# Patient Record
Sex: MALE | Race: WHITE | NOT HISPANIC OR LATINO | Employment: OTHER | URBAN - NONMETROPOLITAN AREA
[De-identification: names, ages, dates, MRNs, and addresses within clinical notes are randomized per-mention and may not be internally consistent; named-entity substitution may affect disease eponyms.]

---

## 2023-09-12 ENCOUNTER — OFFICE VISIT (OUTPATIENT)
Dept: CARDIOLOGY | Facility: CLINIC | Age: 78
End: 2023-09-12
Payer: MEDICARE

## 2023-09-12 VITALS
OXYGEN SATURATION: 96 % | WEIGHT: 184.4 LBS | HEART RATE: 74 BPM | BODY MASS INDEX: 27.95 KG/M2 | HEIGHT: 68 IN | DIASTOLIC BLOOD PRESSURE: 68 MMHG | SYSTOLIC BLOOD PRESSURE: 134 MMHG

## 2023-09-12 DIAGNOSIS — I65.23 BILATERAL CAROTID ARTERY STENOSIS: Primary | ICD-10-CM

## 2023-09-12 DIAGNOSIS — I10 ESSENTIAL HYPERTENSION: ICD-10-CM

## 2023-09-12 DIAGNOSIS — E78.5 DYSLIPIDEMIA: ICD-10-CM

## 2023-09-12 RX ORDER — ROSUVASTATIN CALCIUM 20 MG/1
20 TABLET, COATED ORAL DAILY
COMMUNITY
Start: 2023-05-30

## 2023-09-12 RX ORDER — NIFEDIPINE 90 MG/1
90 TABLET, EXTENDED RELEASE ORAL DAILY
Qty: 30 TABLET | Refills: 11 | COMMUNITY
Start: 2023-09-06 | End: 2024-09-05

## 2023-09-12 RX ORDER — CETIRIZINE HYDROCHLORIDE 10 MG/1
10 TABLET ORAL DAILY
Qty: 30 TABLET | Refills: 11 | COMMUNITY
Start: 2023-08-09 | End: 2024-08-08

## 2023-09-12 RX ORDER — ASPIRIN 81 MG/1
81 TABLET ORAL DAILY
COMMUNITY

## 2023-09-12 RX ORDER — LEVOCETIRIZINE DIHYDROCHLORIDE 5 MG/1
1 TABLET, FILM COATED ORAL DAILY
COMMUNITY
Start: 2023-08-07

## 2023-09-12 NOTE — PROGRESS NOTES
Subjective     Vimal Austin is a 78 y.o. male who presents today for Establish Care (Cardiac Eval. For Carotid Artery stenosis/Records in Care everywhere ).    CHIEF COMPLIANT  Chief Complaint   Patient presents with    Establish Care     Cardiac Eval. For Carotid Artery stenosis  Records in Care everywhere        Active Problems:  1.  Carotid stenosis status post right CEA, status post left CEA in 2022  2.  Hyperlipidemia  3.  Hypertension  4.  Former smoker    HPI  The patient is a 78-year-old male with a history of bilateral carotid stenosis status post right and left CEA that was referred to establish ongoing cardiology care in our clinic.  Overall the patient is doing well.  He denies chest pain, shortness of air, syncope, near syncope, dizziness, edema, PND, orthopnea or palpitations.  He has no history of CAD.  We reviewed his most recent lipid panel from earlier in 2023 which showed overall cholesterol 133, HDL 48, LDL 69, TSH 4.5.  His blood pressure is under good control with current regimen.  His EKG today shows normal sinus rhythm, normal axis, no acute ST or T wave abnormalities.    PRIOR MEDS  Current Outpatient Medications on File Prior to Visit   Medication Sig Dispense Refill    aspirin 81 MG EC tablet Take 1 tablet by mouth Daily.      cetirizine (zyrTEC) 10 MG tablet Take 1 tablet by mouth Daily. 30 tablet 11    levocetirizine (XYZAL) 5 MG tablet Take 1 tablet by mouth Daily.      NIFEdipine XL (PROCARDIA XL) 90 MG 24 hr tablet Take 1 tablet by mouth Daily. 30 tablet 11    rosuvastatin (CRESTOR) 20 MG tablet Take 1 tablet by mouth Daily.       No current facility-administered medications on file prior to visit.       ALLERGIES  Sulfa antibiotics    HISTORY  Past Medical History:   Diagnosis Date    Carotid artery stenosis        Social History     Socioeconomic History    Marital status:    Tobacco Use    Smoking status: Former     Packs/day: 1.00     Years: 8.00     Pack years: 8.00      "Types: Cigarettes     Quit date: 1970     Years since quittin.7    Smokeless tobacco: Never   Substance and Sexual Activity    Alcohol use: Never    Drug use: Never    Sexual activity: Defer       Family History   Problem Relation Age of Onset    Heart attack Mother     No Known Problems Father     No Known Problems Sister     Cancer Sister     No Known Problems Brother     Cancer Brother     No Known Problems Brother     No Known Problems Brother     No Known Problems Brother        Review of Systems   Constitutional: Negative.  Negative for chills, diaphoresis, fatigue and fever.   HENT: Negative.          Seasonal allergies   Eyes: Negative.  Negative for visual disturbance.   Respiratory:  Positive for cough (seasonal allergies, hay allergy). Negative for apnea, chest tightness, shortness of breath and wheezing.    Cardiovascular: Negative.  Negative for chest pain, palpitations and leg swelling.   Gastrointestinal: Negative.  Negative for blood in stool.   Endocrine: Negative.  Negative for cold intolerance and heat intolerance.   Genitourinary: Negative.  Negative for hematuria.   Musculoskeletal: Negative.  Negative for arthralgias, back pain, myalgias, neck pain and neck stiffness.   Skin: Negative.  Negative for color change, rash and wound.   Allergic/Immunologic: Positive for environmental allergies (seasonal, hay). Negative for food allergies.   Neurological: Negative.  Negative for dizziness, syncope, weakness, light-headedness, numbness and headaches.   Hematological:  Bruises/bleeds easily.   Psychiatric/Behavioral: Negative.  Negative for sleep disturbance.      Objective     VITALS: /68 (BP Location: Right arm, Patient Position: Sitting)   Pulse 74   Ht 172.7 cm (68\")   Wt 83.6 kg (184 lb 6.4 oz)   SpO2 96%   BMI 28.04 kg/m²     LABS:   Lab Results (most recent)       None            IMAGING:   No Images in the past 120 days found..    EXAM:  Physical Exam  Constitutional:       " Appearance: Normal appearance.   Eyes:      Pupils: Pupils are equal, round, and reactive to light.   Cardiovascular:      Rate and Rhythm: Normal rate and regular rhythm.      Pulses:           Carotid pulses are 2+ on the right side and 2+ on the left side.       Radial pulses are 2+ on the right side and 2+ on the left side.        Dorsalis pedis pulses are 2+ on the right side and 2+ on the left side.        Posterior tibial pulses are 2+ on the right side and 2+ on the left side.      Heart sounds: Murmur heard.   Systolic murmur is present with a grade of 1/6.   Pulmonary:      Effort: Pulmonary effort is normal.      Breath sounds: Normal breath sounds.   Abdominal:      General: Bowel sounds are normal.      Palpations: Abdomen is soft.   Musculoskeletal:      Right lower leg: No edema.      Left lower leg: No edema.   Skin:     General: Skin is warm and dry.      Capillary Refill: Capillary refill takes less than 2 seconds.   Neurological:      General: No focal deficit present.      Mental Status: He is alert and oriented to person, place, and time.   Psychiatric:         Mood and Affect: Mood normal.         Thought Content: Thought content normal.       Procedure     ECG 12 Lead    Date/Time: 9/12/2023 1:35 PM  Performed by: Goldie Arevalo APRN  Authorized by: Goldie Arevalo APRN   Previous ECG: no previous ECG available  Rhythm: sinus rhythm  Rate: normal  Conduction: conduction normal  ST Segments: ST segments normal  T Waves: T waves normal  QRS axis: normal  Comments: No acute changes  Qtc 342 MS           Assessment & Plan    Diagnosis Plan   1. Bilateral carotid artery stenosis  Duplex Carotid Ultrasound CAR      2. Essential hypertension        3. Dyslipidemia          Plan:  1.  Bilateral carotid stenosis.  Status post right and left CEA.  We will continue statin and aspirin.  We will obtain a carotid Doppler for ongoing monitoring of carotid stenosis.  We will call him these  results.  2.  Essential hypertension: Blood pressure under good control.  Continue nifedipine.  3.  Dyslipidemia: Continue Crestor.  Most recent lipid panel shows good control.  Will monitor periodically    Return in about 1 year (around 9/12/2024).      Patient brought in medicine list to appointment, it's been reviewed with patient and med list was updated in the chart.      Advance Care Planning   ACP discussion was declined by the patient. Patient does not have an advance directive, declines further assistance.       MEDS ORDERED DURING VISIT:  No orders of the defined types were placed in this encounter.      DISCONTINUED MEDS DURING VISIT:   There are no discontinued medications.       This document has been electronically signed by CHON Perrin  September 17, 2023 13:56 EDT    Dictated Utilizing Dragon Dictation: Part of this note may be an electronic transcription/translation of spoken language to printed text using the Dragon Dictation System

## 2023-09-12 NOTE — PATIENT INSTRUCTIONS
Acute Coronary Syndrome  Acute coronary syndrome (ACS) is a serious problem in which there is suddenly not enough blood and oxygen reaching the heart. ACS can result in chest pain or a heart attack.  This condition is a medical emergency. If you have any symptoms of this condition, get help right away.  What are the causes?  This condition may be caused by:  Atherosclerosis, which is a buildup of fat and cholesterol inside the arteries. This is the most common cause. The buildup (plaque) can cause blood vessels in the heart (coronary arteries) to become narrow or blocked, reducing blood flow to the heart. Plaque can also break off and lead to a clot, which can block an artery and cause a heart attack or stroke.  Sudden tightening of the muscles around the coronary arteries. This is called a coronary spasm.  Tearing of a coronary artery (spontaneous coronary artery dissection).  Very low blood pressure (hypotension).  An abnormal heartbeat (arrhythmia).  Other medical conditions that cause a decrease of oxygen to the heart, such as anemiaorrespiratory failure.  Using drugs such as cocaine or methamphetamine.  What increases the risk?  The following factors may make you more likely to develop this condition:  Age. The risk for ACS increases as you get older.  Personal or family history of chest pain, heart attack, peripheral vascular disease, or stroke.  Having taken chemotherapy or immune-suppressing medicines.  Being male.  Being overweight.  Having any of these conditions:  High cholesterol.  High blood pressure (hypertension).  Diabetes.  Lifestyle choices such as:  Excessive alcohol use.  Not exercising enough.  Smoking.  What are the signs or symptoms?  Common symptoms of this condition include:  Chest pain. The pain may last a long time, or it may stop and come back (recur). It may feel like:  Crushing or squeezing.  Tightness, pressure, fullness, or heaviness.  Arm, neck, jaw, or back pain.  Heartburn or  indigestion.  Shortness of breath.  Nausea.  Sudden cold sweats.  Light-headedness, dizziness, or passing out.  Tiredness (fatigue).  Sometimes there are no symptoms.  How is this diagnosed?  This condition may be diagnosed based on:  Your medical history and symptoms.  Imaging tests, such as:  An electrocardiogram (ECG). This measures the heart's electrical activity.  CT scan.  A coronary angiogram. For this test, dye is injected into the heart arteries and then X-rays are taken.  Echocardiogram. This is a test that uses sound waves to produce detailed images of the heart.  Blood tests to check for cardiac markers. These chemicals are released by a damaged heart muscle. These tests may be repeated at certain time intervals.  Exercise stress testing.  How is this treated?  Treatment for this condition may include:  Medicines, such as:  Antiplatelet medicines that help prevent blood clots, such as aspirin or clopidogrel.  Medicine that dissolves any blood clots (fibrinolytic therapy).  Blood pressure medicines.  Nitroglycerin. This helps widen blood vessels to improve blood flow.  Pain medicine.  Cholesterol-lowering medicine such as statins.  Surgery, such as:  Coronary angioplasty with stent placement. This involves placing a small mesh tube (stent) into a narrow coronary artery. This widens the artery and keeps it open.  Coronary artery bypass surgery. This involves taking a section of a blood vessel from a different part of your body and placing it on the blocked coronary artery to allow blood to flow around the blockage.  Cardiac rehabilitation. This is a program that includes exercise training, education, and counseling to help you recover.  Follow these instructions at home:  Medicines  Take over-the-counter and prescription medicines only as told by your health care provider.  Do not take these medicines unless your health care provider approves:  Any vitamins or supplements.  NSAIDs, such as ibuprofen,  naproxen, or celecoxib.  Hormone replacement therapy that contains estrogen.  If you are taking blood thinners:  Talk with your health care provider before you take any medicines that contain aspirin or NSAIDs. These medicines increase your risk for dangerous bleeding.  Take your medicine exactly as told, at the same time every day.  Avoid activities that could cause injury or bruising, and follow instructions about how to prevent falls.  Wear a medical alert bracelet or carry a card that lists what medicines you take.  Eating and drinking  Eat a heart-healthy diet that includes whole grains, fruits and vegetables, lean proteins, and low-fat or nonfat dairy products.  Limit how much salt (sodium) you eat as told by your health care provider. Follow instructions from your health care provider about any other eating or drinking restrictions, such as limiting foods that are high in fat and processed sugars.  Use healthy cooking methods such as roasting, grilling, broiling, baking, poaching, steaming, or stir-frying.  Work with a dietitian to follow a heart-healthy eating plan.  Activity  Follow your cardiac rehabilitation program. Do exercises as told by your physical therapist.  Ask your health care provider what activities and exercises are safe for you. Follow his or her instructions about lifting, driving, or climbing stairs.  Lifestyle  Do not use any products that contain nicotine or tobacco. These products include cigarettes, chewing tobacco, and vaping devices, such as e-cigarettes. If you need help quitting, ask your health care provider.  Do not drink alcohol if:  Your health care provider tells you not to drink.  You are pregnant, may be pregnant, or are planning to become pregnant.  If you drink alcohol:  Limit how much you have to:  0-1 drink a day for women.  0-2 drinks a day for men.  Know how much alcohol is in your drink. In the [place] one drink equals one 12 oz bottle of beer (355 mL), one 5 oz glass  of wine (148 mL), or one 1½ oz glass of hard liquor (44 mL).  Maintain a healthy weight. If you need to lose weight, work with your health care provider to do so safely.  General instructions  Tell all the health care providers who provide care for you about your heart condition, including your dentist. This may affect the medicines or treatment you receive.  Manage any other health conditions you have, such as hypertension or diabetes. These conditions affect your heart.  Pay attention to your mental health. You may be at higher risk for depression.  Find ways to manage stress.  Talk to your health care provider about depression screening and treatment.  Keep your vaccinations up to date.  Get the flu shot (influenza vaccine) every year.  Get the pneumococcal vaccine if you are age 65 or older.  If directed, monitor your blood pressure at home.  Keep all follow-up visits. This is important.  Contact a health care provider if:  You feel overwhelmed or sad.  You have trouble doing your daily activities.  You have dark stools or blood in your stool.  You have sudden light-headedness or dizziness.  Get help right away if:  You have pain in your chest, neck, arm, jaw, stomach, or back that recurs, and:  It lasts for more than a few minutes.  It is not relieved by taking the medicineyour health care provider prescribed.  You have unexplained:  Heavy sweating.  Heartburn or indigestion.  Nausea or vomiting.  Shortness of breath.  Difficulty breathing.  Nervousness or anxiety.  Weakness.  You have blood pressure that is higher than 180/120.  You faint.  These symptoms may represent a serious problem that is an emergency. Do not wait to see if the symptoms will go away. Get medical help right away. Call your local emergency services (911 in the [place] Do not drive yourself to the hospital.   Summary  Acute coronary syndrome (ACS) is when there is not enough blood and oxygen being supplied to the heart. ACS can result in  chest pain or a heart attack.  Treatment includes medicines and procedures to open the blocked arteries and restore blood flow.  Acute coronary syndrome is a medical emergency. Get help right away if you have sudden pain in your chest, arms, back, neck, jaw, or upper body. Seek help if you have unexplained nausea, vomiting, or shortness of breath.  This information is not intended to replace advice given to you by your health care provider. Make sure you discuss any questions you have with your health care provider.  Document Revised: 06/09/2022 Document Reviewed: 06/09/2022  Elsevier Patient Education © 2023 [employer]

## 2023-09-17 PROBLEM — I65.23 BILATERAL CAROTID ARTERY STENOSIS: Status: ACTIVE | Noted: 2023-09-17

## 2023-09-17 PROBLEM — I10 ESSENTIAL HYPERTENSION: Status: ACTIVE | Noted: 2023-09-17

## 2023-10-12 ENCOUNTER — HOSPITAL ENCOUNTER (OUTPATIENT)
Dept: CARDIOLOGY | Facility: HOSPITAL | Age: 78
Discharge: HOME | End: 2023-10-12
Payer: MEDICARE

## 2023-10-12 DIAGNOSIS — I65.23 BILATERAL CAROTID ARTERY STENOSIS: ICD-10-CM

## 2023-10-12 LAB
BH CV XLRA MEAS LEFT DIST CCA EDV: 9.8 CM/SEC
BH CV XLRA MEAS LEFT DIST CCA PSV: 127 CM/SEC
BH CV XLRA MEAS LEFT DIST ICA EDV: -27.4 CM/SEC
BH CV XLRA MEAS LEFT DIST ICA PSV: -126 CM/SEC
BH CV XLRA MEAS LEFT ICA/CCA RATIO: 1
BH CV XLRA MEAS LEFT MID ICA EDV: -27.4 CM/SEC
BH CV XLRA MEAS LEFT MID ICA PSV: -127 CM/SEC
BH CV XLRA MEAS LEFT PROX CCA EDV: 15.9 CM/SEC
BH CV XLRA MEAS LEFT PROX CCA PSV: 152 CM/SEC
BH CV XLRA MEAS LEFT PROX ECA PSV: -135 CM/SEC
BH CV XLRA MEAS LEFT PROX ICA EDV: -22.7 CM/SEC
BH CV XLRA MEAS LEFT PROX ICA PSV: -99.6 CM/SEC
BH CV XLRA MEAS LEFT VERTEBRAL A EDV: 12.6 CM/SEC
BH CV XLRA MEAS LEFT VERTEBRAL A PSV: 68 CM/SEC
BH CV XLRA MEAS RIGHT DIST CCA EDV: 9.8 CM/SEC
BH CV XLRA MEAS RIGHT DIST CCA PSV: 91.8 CM/SEC
BH CV XLRA MEAS RIGHT DIST ICA EDV: -24.4 CM/SEC
BH CV XLRA MEAS RIGHT DIST ICA PSV: -133 CM/SEC
BH CV XLRA MEAS RIGHT ICA/CCA RATIO: 1.7
BH CV XLRA MEAS RIGHT MID ICA EDV: -26.8 CM/SEC
BH CV XLRA MEAS RIGHT MID ICA PSV: -122 CM/SEC
BH CV XLRA MEAS RIGHT PROX CCA EDV: 14.5 CM/SEC
BH CV XLRA MEAS RIGHT PROX CCA PSV: 125 CM/SEC
BH CV XLRA MEAS RIGHT PROX ECA PSV: -200 CM/SEC
BH CV XLRA MEAS RIGHT PROX ICA EDV: -23.2 CM/SEC
BH CV XLRA MEAS RIGHT PROX ICA PSV: -154 CM/SEC
BH CV XLRA MEAS RIGHT VERTEBRAL A EDV: 9.4 CM/SEC
BH CV XLRA MEAS RIGHT VERTEBRAL A PSV: 51 CM/SEC

## 2023-10-12 PROCEDURE — 93880 EXTRACRANIAL BILAT STUDY: CPT

## 2023-10-24 ENCOUNTER — TELEPHONE (OUTPATIENT)
Dept: CARDIOLOGY | Facility: CLINIC | Age: 78
End: 2023-10-24
Payer: MEDICARE

## 2023-10-24 NOTE — TELEPHONE ENCOUNTER
Patient called for results of carotis testing.   Advised results are not available at this time. Patient will receive a call when recommendations are recieved.

## 2023-11-02 ENCOUNTER — TELEPHONE (OUTPATIENT)
Dept: CARDIOLOGY | Facility: CLINIC | Age: 78
End: 2023-11-02
Payer: MEDICARE

## 2023-11-02 NOTE — TELEPHONE ENCOUNTER
Notified Mamie pts wife ( on pts HIPAA) of patients results. She verbalizes understanding and is aware to call with any questions or concerns.      Goldie Aervalo, Libby Casas LPN  Caller: Unspecified (Today, 12:52 PM)  He had a carotid doppler.   No evidence of hemodynamically significant stenosis in either carotid system.

## 2023-11-02 NOTE — TELEPHONE ENCOUNTER
olimpia    Caller: Vimal Austin    Relationship to patient: Self    Best call back number: [phone]    Patient is needing: TO GO OVER TEST RESULTS FROM HIS ECHO ON  10.12.23. PLEASE ADVISE THANK YOU

## 2024-09-12 ENCOUNTER — OFFICE VISIT (OUTPATIENT)
Dept: CARDIOLOGY | Facility: CLINIC | Age: 79
End: 2024-09-12
Payer: MEDICARE

## 2024-09-12 VITALS
WEIGHT: 179.2 LBS | DIASTOLIC BLOOD PRESSURE: 77 MMHG | OXYGEN SATURATION: 99 % | HEART RATE: 71 BPM | SYSTOLIC BLOOD PRESSURE: 157 MMHG | BODY MASS INDEX: 27.16 KG/M2 | HEIGHT: 68 IN

## 2024-09-12 DIAGNOSIS — E78.5 DYSLIPIDEMIA: ICD-10-CM

## 2024-09-12 DIAGNOSIS — I10 ESSENTIAL HYPERTENSION: ICD-10-CM

## 2024-09-12 DIAGNOSIS — I65.23 BILATERAL CAROTID ARTERY STENOSIS: Primary | ICD-10-CM

## 2024-09-12 PROCEDURE — 3077F SYST BP >= 140 MM HG: CPT | Performed by: CLINICAL NURSE SPECIALIST

## 2024-09-12 PROCEDURE — 3078F DIAST BP <80 MM HG: CPT | Performed by: CLINICAL NURSE SPECIALIST

## 2024-09-12 PROCEDURE — 99214 OFFICE O/P EST MOD 30 MIN: CPT | Performed by: CLINICAL NURSE SPECIALIST

## 2024-09-12 PROCEDURE — 93000 ELECTROCARDIOGRAM COMPLETE: CPT | Performed by: CLINICAL NURSE SPECIALIST

## 2024-09-12 RX ORDER — FEXOFENADINE HCL 180 MG/1
180 TABLET ORAL DAILY
COMMUNITY
Start: 2024-03-18

## 2024-09-12 RX ORDER — ATORVASTATIN CALCIUM 20 MG/1
1 TABLET, FILM COATED ORAL DAILY
COMMUNITY
Start: 2024-04-05

## 2024-09-12 NOTE — PROGRESS NOTES
Subjective     Vimal Austin is a 79 y.o. male who presents today for Follow-up (1 year follow up).    CHIEF COMPLIANT  Chief Complaint   Patient presents with    Follow-up     1 year follow up       Active Problems:  1.  Carotid stenosis status post right CEA, status post left CEA in   2.  Hyperlipidemia  3.  Hypertension  4.  Former smoker    HPI  The patient is an 79 year old male that returns for follow up.  Overall he is doing well.  He denies chest pain, dyspnea, syncope, near syncope or palpitations.  He states he remains active without limitations.    PRIOR MEDS  Current Outpatient Medications on File Prior to Visit   Medication Sig Dispense Refill    aspirin 81 MG EC tablet Take 1 tablet by mouth Daily.      atorvastatin (LIPITOR) 20 MG tablet Take 1 tablet by mouth Daily.      fexofenadine (ALLEGRA) 180 MG tablet Take 1 tablet by mouth Daily.      NIFEdipine XL (PROCARDIA XL) 90 MG 24 hr tablet Take 30 mg by mouth Daily. 30 tablet 11    cetirizine (zyrTEC) 10 MG tablet Take 1 tablet by mouth Daily. 30 tablet 11    levocetirizine (XYZAL) 5 MG tablet Take 1 tablet by mouth Daily.      rosuvastatin (CRESTOR) 20 MG tablet Take 1 tablet by mouth Daily. (Patient not taking: Reported on 2024)       No current facility-administered medications on file prior to visit.       ALLERGIES  Sulfa antibiotics    HISTORY  Past Medical History:   Diagnosis Date    Carotid artery stenosis        Social History     Socioeconomic History    Marital status:    Tobacco Use    Smoking status: Former     Current packs/day: 0.00     Average packs/day: 1 pack/day for 8.0 years (8.0 ttl pk-yrs)     Types: Cigarettes     Start date:      Quit date: 1970     Years since quittin.7    Smokeless tobacco: Never   Substance and Sexual Activity    Alcohol use: Never    Drug use: Never    Sexual activity: Defer       Family History   Problem Relation Age of Onset    Heart attack Mother     No Known Problems Father      "No Known Problems Sister     Cancer Sister     No Known Problems Brother     Cancer Brother     No Known Problems Brother     No Known Problems Brother     No Known Problems Brother        Review of Systems   Constitutional:  Negative for chills and fatigue.   HENT: Negative.     Eyes: Negative.    Respiratory:  Negative for shortness of breath and wheezing.    Cardiovascular:  Negative for chest pain, palpitations and leg swelling.   Gastrointestinal: Negative.    Endocrine: Negative.    Genitourinary: Negative.    Musculoskeletal: Negative.    Skin: Negative.    Allergic/Immunologic: Negative for environmental allergies.   Neurological:  Negative for dizziness, weakness, light-headedness and numbness.   Hematological:  Bruises/bleeds easily.   Psychiatric/Behavioral:  Negative for sleep disturbance.        Objective     VITALS: /77   Pulse 71   Ht 172.7 cm (68\")   Wt 81.3 kg (179 lb 3.2 oz)   SpO2 99%   BMI 27.25 kg/m²     LABS:   Lab Results (most recent)       None            IMAGING:   No Images in the past 120 days found..    EXAM:  Constitutional:       Appearance: Normal appearance.   Eyes:      Pupils: Pupils are equal, round, and reactive to light.   Cardiovascular:      Rate and Rhythm: Normal rate and regular rhythm.      Pulses:           Carotid pulses are 2+ on the right side and 2+ on the left side.       Radial pulses are 2+ on the right side and 2+ on the left side.        Dorsalis pedis pulses are 2+ on the right side and 2+ on the left side.        Posterior tibial pulses are 2+ on the right side and 2+ on the left side.      Heart sounds: Murmur heard.   Systolic murmur is present with a grade of 1/6.   Pulmonary:      Effort: Pulmonary effort is normal.      Breath sounds: Normal breath sounds.   Abdominal:      General: Bowel sounds are normal.      Palpations: Abdomen is soft.   Musculoskeletal:      Right lower leg: No edema.      Left lower leg: No edema.   Skin:     General: " Skin is warm and dry.      Capillary Refill: Capillary refill takes less than 2 seconds.   Neurological:      General: No focal deficit present.      Mental Status: He is alert and oriented to person, place, and time.   Psychiatric:         Mood and Affect: Mood normal.         Thought Content: Thought content normal.   Procedure     ECG 12 Lead    Date/Time: 9/12/2024 11:18 AM  Performed by: Goldie Arevalo APRN    Authorized by: Goldie Arevalo APRN  Comparison: compared with previous ECG   Similar to previous ECG  Rhythm: sinus rhythm  Rate: normal  BPM: 66  Conduction: conduction normal  ST Segments: ST segments normal  T Waves: T waves normal  QRS axis: normal  Comments: No acute changes  Qtc 400ms              Assessment & Plan    Diagnosis Plan   1. Bilateral carotid artery stenosis  ECG 12 Lead      2. Essential hypertension        3. Dyslipidemia          Plan:  1.  Bilateral carotid stenosis.  Status post right and left CEA.  We will continue statin and aspirin.  His most recent carotid Doppler showed no hemodynamically significant stenosis.  He is doing well.    2.  Essential hypertension: Blood pressure is a little elevated in the office but he states this runs in an acceptable range at home.  The patient was instructed to monitor BP at home and to notify our office if systolic BP is consistently greater than 130-135 systolic.  Goal BP is 130-135/70-80.  Continue nifedipine.  3.  Dyslipidemia: Continue statin.  Will periodically review lipid panel.      Return in about 1 year (around 9/12/2025).    Vimal Austin  reports that he quit smoking about 54 years ago. His smoking use included cigarettes. He started smoking about 62 years ago. He has a 8 pack-year smoking history. He has never used smokeless tobacco..            MEDS ORDERED DURING VISIT:  No orders of the defined types were placed in this encounter.      DISCONTINUED MEDS DURING VISIT:   There are no discontinued medications.       This  document has been electronically signed by CHON Perrin  September 12, 2024 11:26 EDT    Dictated Utilizing Dragon Dictation: Part of this note may be an electronic transcription/translation of spoken language to printed text using the Dragon Dictation System